# Patient Record
Sex: MALE | Race: WHITE | Employment: FULL TIME | URBAN - METROPOLITAN AREA
[De-identification: names, ages, dates, MRNs, and addresses within clinical notes are randomized per-mention and may not be internally consistent; named-entity substitution may affect disease eponyms.]

---

## 2019-05-15 ENCOUNTER — OFFICE VISIT (OUTPATIENT)
Dept: PRIMARY CARE CLINIC | Age: 34
End: 2019-05-15
Payer: COMMERCIAL

## 2019-05-15 VITALS
DIASTOLIC BLOOD PRESSURE: 66 MMHG | HEART RATE: 81 BPM | TEMPERATURE: 99.1 F | SYSTOLIC BLOOD PRESSURE: 105 MMHG | BODY MASS INDEX: 27.46 KG/M2 | HEIGHT: 70 IN | WEIGHT: 191.8 LBS | RESPIRATION RATE: 14 BRPM

## 2019-05-15 DIAGNOSIS — B35.1 ONYCHOMYCOSIS: ICD-10-CM

## 2019-05-15 DIAGNOSIS — F34.1 DYSTHYMIA: Primary | ICD-10-CM

## 2019-05-15 DIAGNOSIS — Z13.31 POSITIVE DEPRESSION SCREENING: ICD-10-CM

## 2019-05-15 DIAGNOSIS — B07.8 VERRUCA PALMARIS: ICD-10-CM

## 2019-05-15 DIAGNOSIS — Z13.220 LIPID SCREENING: ICD-10-CM

## 2019-05-15 DIAGNOSIS — G89.29 CHRONIC HIP PAIN, BILATERAL: ICD-10-CM

## 2019-05-15 DIAGNOSIS — M25.552 CHRONIC HIP PAIN, BILATERAL: ICD-10-CM

## 2019-05-15 DIAGNOSIS — M25.551 CHRONIC HIP PAIN, BILATERAL: ICD-10-CM

## 2019-05-15 PROCEDURE — G8419 CALC BMI OUT NRM PARAM NOF/U: HCPCS | Performed by: NURSE PRACTITIONER

## 2019-05-15 PROCEDURE — G8427 DOCREV CUR MEDS BY ELIG CLIN: HCPCS | Performed by: NURSE PRACTITIONER

## 2019-05-15 PROCEDURE — G8431 POS CLIN DEPRES SCRN F/U DOC: HCPCS | Performed by: NURSE PRACTITIONER

## 2019-05-15 PROCEDURE — 4004F PT TOBACCO SCREEN RCVD TLK: CPT | Performed by: NURSE PRACTITIONER

## 2019-05-15 PROCEDURE — 99203 OFFICE O/P NEW LOW 30 MIN: CPT | Performed by: NURSE PRACTITIONER

## 2019-05-15 PROCEDURE — G0444 DEPRESSION SCREEN ANNUAL: HCPCS | Performed by: NURSE PRACTITIONER

## 2019-05-15 RX ORDER — BUPROPION HYDROCHLORIDE 150 MG/1
150 TABLET ORAL EVERY MORNING
Qty: 30 TABLET | Refills: 0 | Status: SHIPPED | OUTPATIENT
Start: 2019-05-15 | End: 2019-06-13 | Stop reason: ALTCHOICE

## 2019-05-15 RX ORDER — BUPROPION HYDROCHLORIDE 150 MG/1
150-300 TABLET ORAL EVERY MORNING
Qty: 60 TABLET | Refills: 0 | Status: SHIPPED | OUTPATIENT
Start: 2019-05-15 | End: 2019-05-15 | Stop reason: SDUPTHER

## 2019-05-15 ASSESSMENT — PATIENT HEALTH QUESTIONNAIRE - PHQ9
SUM OF ALL RESPONSES TO PHQ9 QUESTIONS 1 & 2: 6
2. FEELING DOWN, DEPRESSED OR HOPELESS: 3
5. POOR APPETITE OR OVEREATING: 0
10. IF YOU CHECKED OFF ANY PROBLEMS, HOW DIFFICULT HAVE THESE PROBLEMS MADE IT FOR YOU TO DO YOUR WORK, TAKE CARE OF THINGS AT HOME, OR GET ALONG WITH OTHER PEOPLE: 3
6. FEELING BAD ABOUT YOURSELF - OR THAT YOU ARE A FAILURE OR HAVE LET YOURSELF OR YOUR FAMILY DOWN: 3
1. LITTLE INTEREST OR PLEASURE IN DOING THINGS: 3
3. TROUBLE FALLING OR STAYING ASLEEP: 0
7. TROUBLE CONCENTRATING ON THINGS, SUCH AS READING THE NEWSPAPER OR WATCHING TELEVISION: 3
SUM OF ALL RESPONSES TO PHQ QUESTIONS 1-9: 13
8. MOVING OR SPEAKING SO SLOWLY THAT OTHER PEOPLE COULD HAVE NOTICED. OR THE OPPOSITE, BEING SO FIGETY OR RESTLESS THAT YOU HAVE BEEN MOVING AROUND A LOT MORE THAN USUAL: 0
4. FEELING TIRED OR HAVING LITTLE ENERGY: 1
9. THOUGHTS THAT YOU WOULD BE BETTER OFF DEAD, OR OF HURTING YOURSELF: 0
SUM OF ALL RESPONSES TO PHQ QUESTIONS 1-9: 13

## 2019-05-15 ASSESSMENT — ENCOUNTER SYMPTOMS
SORE THROAT: 0
SHORTNESS OF BREATH: 0
CONSTIPATION: 0
COUGH: 0
ABDOMINAL PAIN: 0
VOMITING: 0
DIARRHEA: 0
RHINORRHEA: 0
NAUSEA: 0
VISUAL CHANGE: 0
WHEEZING: 0

## 2019-05-15 NOTE — PROGRESS NOTES
Name: Pasquale Vizcaino  : 1985         Chief Complaint:     Chief Complaint   Patient presents with   Atamaria [address] \"I have not seen a doctor in 10 years. \"     Nail Problem     Left hand finger nails. Also c/o \"warts on both hands. \"     Hip Problem     Bilat hips \"Crack and pop x 10 years. \" Sattes \"I have trouble doing squats at the gym. \"     Mental Health Problem     Sattes \"I have trouble hearing what people are saying when other are around. \" States \"I have been told I may have ADD. \"        History of Present Illness:      Pasquale Vizcaino is a 29 y.o.  male who presents with Establish Care (States \"I have not seen a doctor in 10 years. \" ); Nail Problem (Left hand finger nails. Also c/o \"warts on both hands. \" ); Hip Problem (Bilat hips \"Crack and pop x 10 years. \" Sattes \"I have trouble doing squats at the gym. \" ); and Mental Health Problem (Sattes \"I have trouble hearing what people are saying when other are around. \" States \"I have been told I may have ADD. \" )      Carter Fong is here today to establish care. 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 The primary symptoms do not include hallucinations. The current episode started more than 1 month ago. This is a chronic problem. The onset of the illness is precipitated by emotional stress. The degree of incapacity that he is experiencing as a consequence of his illness is moderate. Sequelae of the illness include harmed interpersonal relations. Additional symptoms of the illness include anhedonia, fatigue, agitation, attention impairment, distractible and poor judgment.  Additional symptoms of the illness do not include insomnia, hypersomnia, appetite change, unexpected weight change, psychomotor retardation, feelings of worthlessness, euphoric mood, increased goal-directed activity, flight of ideas, inflated self-esteem, decreased need for sleep, visual change, headaches, abdominal pain or seizures. He does not admit to suicidal ideas. He does not have a plan to commit suicide. He does not contemplate harming himself. He has not already injured self. He does not contemplate injuring another person. He has not already  injured another person. Hip Pain    The incident occurred more than 1 week ago (BILATERAL \"POPPING\" AND \"CRACKING\"). The incident occurred at the gym. The injury mechanism is unknown. The pain is present in the right hip and left hip. The quality of the pain is described as cramping. The pain is at a severity of 6/10. The pain is moderate. The pain has been intermittent since onset. Associated symptoms include a loss of motion. Pertinent negatives include no inability to bear weight, loss of sensation, muscle weakness, numbness or tingling. He reports no foreign bodies present. The symptoms are aggravated by movement and weight bearing. He has tried non-weight bearing and rest for the symptoms. The treatment provided mild relief. Past Medical History:     History reviewed. No pertinent past medical history. Reviewed all health maintenance requirements and ordered appropriate tests  There are no preventive care reminders to display for this patient. Past Surgical History:     History reviewed. No pertinent surgical history. Medications:       Prior to Admission medications    Medication Sig Start Date End Date Taking? Authorizing Provider   buPROPion (WELLBUTRIN XL) 150 MG extended release tablet Take 1 tablet by mouth every morning 5/15/19 6/14/19 Yes KHANH Benton CNP        Allergies:       Patient has no known allergies. Social History:     Tobacco:    reports that he has been smoking.   He has regular rhythm. Pulmonary/Chest: Effort normal and breath sounds normal.   Abdominal: Soft. Bowel sounds are normal. He exhibits no distension. There is no tenderness. Musculoskeletal: He exhibits tenderness. He exhibits no edema. Lymphadenopathy:     He has no cervical adenopathy. Neurological: He is alert and oriented to person, place, and time. Skin: Skin is warm and dry. Psychiatric: He has a normal mood and affect. His speech is normal and behavior is normal. Judgment and thought content normal. His mood appears not anxious. Cognition and memory are normal. He does not exhibit a depressed mood. He expresses no homicidal and no suicidal ideation. He expresses no suicidal plans and no homicidal plans. He is inattentive. Nursing note and vitals reviewed. Data:     No results found for: NA, K, CL, CO2, BUN, CREATININE, GLUCOSE, PROT, LABALBU, BILITOT, ALKPHOS, AST, ALT  No results found for: WBC, RBC, HGB, HCT, MCV, MCH, MCHC, RDW, PLT, MPV  No results found for: TSH  No results found for: CHOL, HDL, PSA, LABA1C    Assessment/Plan:      Diagnosis Orders   1. Dysthymia  Basic Metabolic Panel   2. Chronic hip pain, bilateral  XR HIP 3-4 VW W PELVIS BILATERAL   3. Onychomycosis  Basic Metabolic Panel    AST    ALT   [facility]. [facility] [place]   5. Positive depression screening  Positive Screen for Clinical Depression with a Documented Follow-up Plan     buPROPion (WELLBUTRIN XL) 150 MG extended release tablet   6. Lipid screening  Lipid Panel       1. Alonso Guido received counseling on the following healthy behaviors: nutrition, exercise, medication adherence and tobacco cessation  2. Patient given educational materials - see patient instructions  3. Was a self-tracking handout given in paper form or via [employer]? No  If yes, see orders or list here. 4.  Discussed use, benefit, and side effects of prescribed medications.   Barriers to medication compliance addressed. All patient questions answered. Pt voiced understanding. 5.  Reviewed prior labs and health maintenance  6. Continue current medications, diet and exercise. Completed Refills   Requested Prescriptions     Signed Prescriptions Disp Refills    buPROPion (WELLBUTRIN XL) 150 MG extended release tablet 30 tablet 0     Sig: Take 1 tablet by mouth every morning         Return in about 1 month (around 6/12/2019) for recheck.

## 2019-05-15 NOTE — PROGRESS NOTES
Name: Lay Peraza  : 1985         Chief Complaint:     Chief Complaint   Patient presents with   Atamaria 86 \"I have not seen a doctor in 10 years. \"     Nail Problem     Left hand finger nails. Also c/o \"warts on both hands. \"     Hip Problem     Bilat hips \"Crack and pop x 10 years. \" Sattes \"I have trouble doing squats at the gym. \"     Mental Health Problem     Sattes \"I have trouble hearing what people are saying when other are around. \" States \"I have been told I may have ADD. \"        History of Present Illness:      Lay Peraza is a 29 y.o.  male who presents with Establish Care (States \"I have not seen a doctor in 10 years. \" ); Nail Problem (Left hand finger nails. Also c/o \"warts on both hands. \" ); Hip Problem (Bilat hips \"Crack and pop x 10 years. \" Sattes \"I have trouble doing squats at the gym. \" ); and Mental Health Problem (Sattes \"I have trouble hearing what people are saying when other are around. \" States \"I have been told I may have ADD. \" )      HPI      Past Medical History:     History reviewed. No pertinent past medical history. Reviewed all health maintenance requirements and ordered appropriate tests  Health Maintenance Due   Topic Date Due    Varicella Vaccine (1 of 2 - 13+ 2-dose series) 1998    HIV screen  2000    DTaP/Tdap/Td vaccine (1 - Tdap) 2004       Past Surgical History:     History reviewed. No pertinent surgical history. Medications:       Prior to Admission medications    Not on File        Allergies:       Patient has no known allergies. Social History:     Tobacco:    reports that he has been smoking. He has a 5.00 pack-year smoking history. He uses smokeless tobacco.  Alcohol:      reports that he drinks alcohol. Drug Use:  reports that he does not use drugs.     Family History:     Family History   Problem Relation Age of Onset    High Blood Pressure Mother    [facility] Migraines Mother     Lung Cancer Father        Review of Systems:     Positive and Negative as described in HPI    Review of Systems    Physical Exam:   Vitals: There were no vitals taken for this visit. Physical Exam    Data:     No results found for: NA, K, CL, CO2, BUN, CREATININE, GLUCOSE, PROT, LABALBU, BILITOT, ALKPHOS, AST, ALT  No results found for: WBC, RBC, HGB, HCT, MCV, MCH, MCHC, RDW, PLT, MPV  No results found for: TSH  No results found for: CHOL, HDL, PSA, LABA1C    Assessment/Plan:     {No diagnosis found. (Refresh or delete this SmartLink)}    1. Presley Roberson received counseling on the following healthy behaviors: {HealthCounselin}  2. Patient given educational materials - see patient instructions  3. Was a self-tracking handout given in paper form or via [employer]? {YES / [id number]}  If yes, see orders or list here. 4.  Discussed use, benefit, and side effects of prescribed medications. Barriers to medication compliance addressed. All patient questions answered. Pt voiced understanding. 5.  Reviewed prior labs and health maintenance  6. Continue current medications, diet and exercise. Completed Refills   Requested Prescriptions      No prescriptions requested or ordered in this encounter         No follow-ups on file.

## 2019-05-23 ENCOUNTER — TELEPHONE (OUTPATIENT)
Dept: PRIMARY CARE CLINIC | Age: 34
End: 2019-05-23

## 2019-06-05 ENCOUNTER — HOSPITAL ENCOUNTER (OUTPATIENT)
Age: 34
Discharge: HOME | End: 2019-06-07
Payer: COMMERCIAL

## 2019-06-05 ENCOUNTER — TELEPHONE (OUTPATIENT)
Dept: PRIMARY CARE CLINIC | Age: 34
End: 2019-06-05

## 2019-06-05 ENCOUNTER — PROCEDURE VISIT (OUTPATIENT)
Dept: SURGERY | Age: 34
End: 2019-06-05

## 2019-06-05 ENCOUNTER — HOSPITAL ENCOUNTER (OUTPATIENT)
Dept: GENERAL RADIOLOGY | Age: 34
Discharge: HOME | End: 2019-06-07
Payer: COMMERCIAL

## 2019-06-05 ENCOUNTER — HOSPITAL ENCOUNTER (OUTPATIENT)
Age: 34
Discharge: HOME | End: 2019-06-05
Payer: COMMERCIAL

## 2019-06-05 VITALS
HEIGHT: 70 IN | WEIGHT: 193 LBS | HEART RATE: 71 BPM | DIASTOLIC BLOOD PRESSURE: 69 MMHG | BODY MASS INDEX: 27.63 KG/M2 | SYSTOLIC BLOOD PRESSURE: 106 MMHG | TEMPERATURE: 96 F | RESPIRATION RATE: 16 BRPM

## 2019-06-05 DIAGNOSIS — Z13.220 LIPID SCREENING: ICD-10-CM

## 2019-06-05 DIAGNOSIS — M25.552 CHRONIC HIP PAIN, BILATERAL: Primary | ICD-10-CM

## 2019-06-05 DIAGNOSIS — B35.1 ONYCHOMYCOSIS: ICD-10-CM

## 2019-06-05 DIAGNOSIS — M25.551 CHRONIC HIP PAIN, BILATERAL: Primary | ICD-10-CM

## 2019-06-05 DIAGNOSIS — B37.2: Primary | ICD-10-CM

## 2019-06-05 DIAGNOSIS — M25.551 CHRONIC HIP PAIN, BILATERAL: ICD-10-CM

## 2019-06-05 DIAGNOSIS — G89.29 CHRONIC HIP PAIN, BILATERAL: ICD-10-CM

## 2019-06-05 DIAGNOSIS — M25.552 CHRONIC HIP PAIN, BILATERAL: ICD-10-CM

## 2019-06-05 DIAGNOSIS — B07.8 PALMAR WART: ICD-10-CM

## 2019-06-05 DIAGNOSIS — G89.29 CHRONIC HIP PAIN, BILATERAL: Primary | ICD-10-CM

## 2019-06-05 DIAGNOSIS — B35.1 ONYCHOMYCOSIS: Primary | ICD-10-CM

## 2019-06-05 LAB
ALT SERPL-CCNC: 9 U/L (ref 5–41)
ANION GAP SERPL CALCULATED.3IONS-SCNC: 7 MMOL/L (ref 9–17)
AST SERPL-CCNC: 15 U/L
BUN BLDV-MCNC: 15 MG/DL (ref 6–20)
BUN/CREAT BLD: 14 (ref 9–20)
CALCIUM SERPL-MCNC: 9.3 MG/DL (ref 8.6–10.4)
CHLORIDE BLD-SCNC: 102 MMOL/L (ref 98–107)
CHOLESTEROL/HDL RATIO: 3.2
CHOLESTEROL: 136 MG/DL
CO2: 26 MMOL/L (ref 20–31)
CREAT SERPL-MCNC: 1.09 MG/DL (ref 0.7–1.2)
GFR AFRICAN AMERICAN: >60 ML/MIN
GFR NON-AFRICAN AMERICAN: >60 ML/MIN
GFR SERPL CREATININE-BSD FRML MDRD: ABNORMAL ML/MIN/{1.73_M2}
GFR SERPL CREATININE-BSD FRML MDRD: ABNORMAL ML/MIN/{1.73_M2}
GLUCOSE BLD-MCNC: 85 MG/DL (ref 70–99)
HDLC SERPL-MCNC: 42 MG/DL
LDL CHOLESTEROL: 83 MG/DL (ref 0–130)
POTASSIUM SERPL-SCNC: 4.3 MMOL/L (ref 3.7–5.3)
SODIUM BLD-SCNC: 135 MMOL/L (ref 135–144)
TRIGL SERPL-MCNC: 57 MG/DL
VLDLC SERPL CALC-MCNC: NORMAL MG/DL (ref 1–30)

## 2019-06-05 PROCEDURE — 84460 ALANINE AMINO (ALT) (SGPT): CPT

## 2019-06-05 PROCEDURE — 73522 X-RAY EXAM HIPS BI 3-4 VIEWS: CPT

## 2019-06-05 PROCEDURE — 80061 LIPID PANEL: CPT

## 2019-06-05 PROCEDURE — 80048 BASIC METABOLIC PNL TOTAL CA: CPT

## 2019-06-05 PROCEDURE — 36415 COLL VENOUS BLD VENIPUNCTURE: CPT

## 2019-06-05 PROCEDURE — 84450 TRANSFERASE (AST) (SGOT): CPT

## 2019-06-05 PROCEDURE — 99999 PR OFFICE/OUTPT VISIT,PROCEDURE ONLY: CPT | Performed by: SURGERY

## 2019-06-11 ENCOUNTER — TELEPHONE (OUTPATIENT)
Dept: PRIMARY CARE CLINIC | Age: 34
End: 2019-06-11

## 2019-06-11 NOTE — TELEPHONE ENCOUNTER
[facility] called to report that they have attempted to contact patient 3 times to schedule the referral. No response from patient.

## 2019-06-13 ENCOUNTER — OFFICE VISIT (OUTPATIENT)
Dept: PRIMARY CARE CLINIC | Age: 34
End: 2019-06-13
Payer: COMMERCIAL

## 2019-06-13 VITALS
BODY MASS INDEX: 27.46 KG/M2 | TEMPERATURE: 97.7 F | DIASTOLIC BLOOD PRESSURE: 68 MMHG | SYSTOLIC BLOOD PRESSURE: 96 MMHG | RESPIRATION RATE: 16 BRPM | HEART RATE: 76 BPM | WEIGHT: 191.4 LBS

## 2019-06-13 DIAGNOSIS — M25.552 CHRONIC HIP PAIN, BILATERAL: ICD-10-CM

## 2019-06-13 DIAGNOSIS — G89.29 CHRONIC HIP PAIN, BILATERAL: ICD-10-CM

## 2019-06-13 DIAGNOSIS — F34.1 DYSTHYMIA: Primary | ICD-10-CM

## 2019-06-13 DIAGNOSIS — M25.551 CHRONIC HIP PAIN, BILATERAL: ICD-10-CM

## 2019-06-13 PROCEDURE — 4004F PT TOBACCO SCREEN RCVD TLK: CPT | Performed by: NURSE PRACTITIONER

## 2019-06-13 PROCEDURE — G8419 CALC BMI OUT NRM PARAM NOF/U: HCPCS | Performed by: NURSE PRACTITIONER

## 2019-06-13 PROCEDURE — G8427 DOCREV CUR MEDS BY ELIG CLIN: HCPCS | Performed by: NURSE PRACTITIONER

## 2019-06-13 PROCEDURE — 99214 OFFICE O/P EST MOD 30 MIN: CPT | Performed by: NURSE PRACTITIONER

## 2019-06-13 RX ORDER — VENLAFAXINE HYDROCHLORIDE 37.5 MG/1
37.5 CAPSULE, EXTENDED RELEASE ORAL DAILY
Qty: 30 CAPSULE | Refills: 0 | Status: SHIPPED | OUTPATIENT
Start: 2019-06-13 | End: 2019-06-27 | Stop reason: SDUPTHER

## 2019-06-13 ASSESSMENT — ENCOUNTER SYMPTOMS
DIARRHEA: 0
SHORTNESS OF BREATH: 0
VOMITING: 0
SORE THROAT: 0
VISUAL CHANGE: 0
ABDOMINAL PAIN: 0
WHEEZING: 0
COUGH: 0
RHINORRHEA: 0
NAUSEA: 0
CONSTIPATION: 0

## 2019-06-13 NOTE — PATIENT INSTRUCTIONS
SURVEY:    You may be receiving a survey from [employer] regarding your visit today. Please complete the survey to enable us to provide the highest quality of care to you and your family. If you cannot score us a very good on any question, please call the office to discuss how we could have made your experience a very good one. Thank you.

## 2019-06-13 NOTE — PROGRESS NOTES
Name: Rica Cardona  : 1985         Chief Complaint:     Chief Complaint   Patient presents with    Other     HIPS POP, CAUSING PT TO LEAN    Medication Check     DOESN'T [address] IS HELPING       History of Present Illness:      Rica Cardona is a 29 y.o.  male who presents with Other (HIPS POP, CAUSING PT TO LEAN) and Medication Check (DOESN'T FEEL Deri Ford)      Daisha Johnson is here today for follow-up office visit. Bilateral hip pain- patient continues to have popping and pain in the hip. Unfortunately there was a miscommunication between he and the orthopedic office that he was referred to. They had the wrong contact number. We will make arrangements today for him to be seen in Ortho. Dysthymia- no improvement with Wellbutrin, patient states no worse no better. Mental Health Problem   The primary symptoms include dysphoric mood (lack of motivation). The primary symptoms do not include hallucinations. The current episode started more than 1 month ago. This is a chronic problem. The onset of the illness is precipitated by emotional stress. The degree of incapacity that he is experiencing as a consequence of his illness is moderate. Sequelae of the illness include harmed interpersonal relations. Additional symptoms of the illness include anhedonia, fatigue, agitation, attention impairment, distractible and poor judgment. Additional symptoms of the illness do not include insomnia, hypersomnia, appetite change, unexpected weight change, psychomotor retardation, feelings of worthlessness, euphoric mood, increased goal-directed activity, flight of ideas, inflated self-esteem, decreased need for sleep, visual change, headaches, abdominal pain or seizures. He does not admit to suicidal ideas. He does not have a plan to commit suicide. He does not contemplate harming himself. He has not already injured self. He does not contemplate injuring another person.  He has not already  injured another person. Hip Pain    The incident occurred more than 1 week ago (BILATERAL \"POPPING\" AND \"CRACKING\"). The incident occurred at the gym. The injury mechanism is unknown. The pain is present in the right hip and left hip. The quality of the pain is described as cramping. The pain is at a severity of 6/10. The pain is moderate. The pain has been intermittent since onset. Associated symptoms include a loss of motion. Pertinent negatives include no inability to bear weight, loss of sensation, muscle weakness, numbness or tingling. He reports no foreign bodies present. The symptoms are aggravated by movement and weight bearing. He has tried non-weight bearing and rest for the symptoms. The treatment provided mild relief. Past Medical History:     History reviewed. No pertinent past medical history. Reviewed all health maintenance requirements and ordered appropriate tests  Health Maintenance Due   Topic Date Due    Pneumococcal 0-64 years Vaccine (1 of 1 - PPSV23) 04/06/1991       Past Surgical History:     History reviewed. No pertinent surgical history. Medications:       Prior to Admission medications    Medication Sig Start Date End Date Taking? Authorizing Provider   venlafaxine (EFFEXOR XR) 37.5 MG extended release capsule Take 1 capsule by mouth daily 6/13/19  Yes Dayami Headings Might, APRN - CNP        Allergies:       Rynatan [azatadine-pseudoephedrine]    Social History:     Tobacco:    reports that he has been smoking. He has a 5.00 pack-year smoking history. He uses smokeless tobacco.  Alcohol:      reports that he drinks alcohol. Drug Use:  reports that he does not use drugs. Family History:     Family History   Problem Relation Age of Onset    High Blood Pressure Mother    [facility] Migraines Mother     Lung Cancer Father        Review of Systems:     Positive and Negative as described in HPI    Review of Systems   Constitutional: Positive for fatigue.  Negative for appetite change, chills, fever and unexpected weight change. HENT: Negative for congestion, rhinorrhea and sore throat. Eyes: Negative for visual disturbance. Respiratory: Negative for cough, shortness of breath and wheezing. Cardiovascular: Negative for chest pain and palpitations. Gastrointestinal: Negative for abdominal pain, constipation, diarrhea, nausea and vomiting. Genitourinary: Negative for difficulty urinating and dysuria. Musculoskeletal: Positive for arthralgias. Negative for gait problem. Skin: Negative for rash. Neurological: Negative for dizziness, tingling, seizures, syncope, numbness and headaches. Psychiatric/Behavioral: Positive for agitation, decreased concentration and dysphoric mood (lack of motivation). Negative for behavioral problems, confusion, hallucinations, self-injury, sleep disturbance and suicidal ideas. The patient is not nervous/anxious, does not have insomnia and is not hyperactive. Physical Exam:   Vitals:  BP 96/68 (Site: Right Upper Arm, Position: Sitting, Cuff Size: Large Adult)   Pulse 76   Temp 97.7 °F (36.5 °C) (Temporal)   Resp 16   Wt 191 lb 6.4 oz (86.8 kg)   BMI 27.46 kg/m²     Physical Exam   Constitutional: He is oriented to person, place, and time. He appears well-developed and well-nourished. No distress. HENT:   Mouth/Throat: Oropharynx is clear and moist.   Eyes: Conjunctivae are normal.   Neck: Normal range of motion. Neck supple. Cardiovascular: Normal rate and regular rhythm. Pulmonary/Chest: Effort normal and breath sounds normal.   Abdominal: Soft. Bowel sounds are normal. He exhibits no distension. There is no tenderness. Musculoskeletal: He exhibits tenderness. He exhibits no edema. Lymphadenopathy:     He has no cervical adenopathy. Neurological: He is alert and oriented to person, place, and time. Skin: Skin is warm and dry.    Psychiatric: His speech is normal and behavior is normal. Judgment and thought content normal. His mood appears not anxious. Cognition and memory are normal. He exhibits a depressed mood (mildly). He expresses no homicidal and no suicidal ideation. He expresses no suicidal plans and no homicidal plans. He is attentive. Nursing note and vitals reviewed. Data:     Lab Results   Component Value Date     06/05/2019    K 4.3 06/05/2019     06/05/2019    CO2 26 06/05/2019    BUN 15 06/05/2019    CREATININE 1.09 06/05/2019    GLUCOSE 85 06/05/2019    PROT 7.2 06/19/2013    LABALBU 4.6 06/19/2013    BILITOT 0.80 06/19/2013    ALKPHOS 88 06/19/2013    AST 15 06/05/2019    ALT 9 06/05/2019     Lab Results   Component Value Date    WBC 14.2 06/19/2013    RBC 4.93 06/19/2013    HGB 15.9 06/19/2013    HCT 47.6 06/19/2013    MCV 96.7 06/19/2013    MCH 32.3 06/19/2013    MCHC 33.4 06/19/2013    RDW 13.0 06/19/2013     06/19/2013    MPV NOT REPORTED 06/19/2013     No results found for: TSH  Lab Results   Component Value Date    CHOL 136 06/05/2019    HDL 42 06/05/2019       Assessment/Plan:      Diagnosis Orders   1. Dysthymia  venlafaxine (EFFEXOR XR) 37.5 MG extended release capsule   2. Chronic hip pain, bilateral       We will stop Wellbutrin and start venlafaxine 37.5 mg daily. He will increase to 75 mg next week. He will call in 2 weeks with results. He will call sooner if any problems. We will make arrangements for Ortho appointment for him in the office today. 1.  Gege Jose received counseling on the following healthy behaviors: nutrition, exercise and medication adherence  2. Patient given educational materials - see patient instructions  3. Was a self-tracking handout given in paper form or via [employer]? No  If yes, see orders or list here. 4.  Discussed use, benefit, and side effects of prescribed medications. Barriers to medication compliance addressed. All patient questions answered. Pt voiced understanding. 5.  Reviewed prior labs and health maintenance  6. Continue current medications, diet and exercise. Completed Refills   Requested Prescriptions     Signed Prescriptions Disp Refills    venlafaxine (EFFEXOR XR) 37.5 MG extended release capsule 30 capsule 0     Sig: Take 1 capsule by mouth daily         Return in about 6 months (around 12/13/2019) for CHECK UP.

## 2019-06-14 ENCOUNTER — TELEPHONE (OUTPATIENT)
Dept: PRIMARY CARE CLINIC | Age: 34
End: 2019-06-14

## 2019-06-27 ENCOUNTER — TELEPHONE (OUTPATIENT)
Dept: PRIMARY CARE CLINIC | Age: 34
End: 2019-06-27

## 2019-06-27 DIAGNOSIS — F34.1 DYSTHYMIA: ICD-10-CM

## 2019-06-27 RX ORDER — VENLAFAXINE HYDROCHLORIDE 150 MG/1
150 CAPSULE, EXTENDED RELEASE ORAL DAILY
Qty: 90 CAPSULE | Refills: 1 | Status: SHIPPED | OUTPATIENT
Start: 2019-06-27

## 2019-06-27 NOTE — TELEPHONE ENCOUNTER
Called patient and informed him that Aren Flores has increased his Venlafaxine to 150mg daily and new script was sent to pharmacy. Informed patient that he is to call office end of next week with progress. Verbalizes understanding.

## 2023-07-26 ENCOUNTER — HOSPITAL ENCOUNTER (EMERGENCY)
Age: 38
Discharge: HOME | End: 2023-07-26
Payer: SELF-PAY

## 2023-07-26 VITALS
OXYGEN SATURATION: 98 % | TEMPERATURE: 98.3 F | RESPIRATION RATE: 18 BRPM | SYSTOLIC BLOOD PRESSURE: 111 MMHG | DIASTOLIC BLOOD PRESSURE: 71 MMHG | HEART RATE: 96 BPM

## 2023-07-26 VITALS — BODY MASS INDEX: 25.8 KG/M2

## 2023-07-26 DIAGNOSIS — H10.9: Primary | ICD-10-CM

## 2023-07-26 DIAGNOSIS — H57.12: ICD-10-CM

## 2023-07-26 PROCEDURE — 99283 EMERGENCY DEPT VISIT LOW MDM: CPT

## 2023-07-26 NOTE — ED_ITS
HPI - Eye Problem    
General    
Chief complaint: Eye Problems    
Stated complaint: ITCHY RED EYE    
Time Seen by Provider: 07/26/23 16:04    
Source: patient    
Mode of arrival: walk-in    
Limitations: no limitations    
History of Present Illness    
HPI Narrative:     
patient is a 38-year-old male who presents the emergency department for left eye  
itching, redness and pain that began yesterday after he believes he got   
something in his eye. He states he removed his contact but his eye feels   
irritated. He denies any metal exposure or direct injury to the eye. He states   
today his eye was swollen shut. He has had no double vision or loss of vision.   
No medications taken prior to arrival. Unknown last tetanus.    
Related Data    
                                  Previous Rx's    
    
    
    
 Medication  Instructions  Recorded    
     
diclofenac sodium 0.1 % eye drops 2 drp ophthalmic (eye) Q6H PRN eye 07/26/23    
    
 pain 2 days #2.5 mL     
     
hydroxyzine HCl 25 mg tablet 25 mg PO Q6H PRN itching #20 tabs 07/26/23    
    
    
    
                                    Allergies    
    
    
    
Allergy/AdvReac Type Severity Reaction Status Date / Time    
     
No Known Drug Allergies Allergy   Verified 07/26/23 16:08    
    
    
    
    
Review of Systems    
    
    
ROS      
    
 Constitutional Denies: fever or chills       
    
 Eyes Reports: blurry vision and eye discomfort       
    
 Cardiovascular Denies: chest pain       
    
 Respiratory Denies: shortness of breath or cough       
    
 Gastrointestinal Denies: nausea or vomiting       
    
 Musculoskeletal Denies: back pain       
    
 Integumentary/Breast Denies: rash       
    
 Neurological Denies: headache       
    
    
Exam    
Narrative    
Exam Narrative:     
Gen.: Awake, alert, in no distress    
Head: Normocephalic, atraumatic    
ENT: Moist mucous membranes, left eye is erythematous and injected at the   
conjunctiva with no visible foreign body noted. Normal extraocular motion,   
pupils were equal round and reactive to light, the patient had no pain with   
extraocular motion. The patient had Alcaine applied to the bilateral eyes,   
fluorescein dye was instilled. The patient had exam with Osman lamp that did not  
show evidence of uptake at the cornea. General irritation noted of the   
conjunctiva. The patient had no involvement over the pupil. There was evidence   
of conjunctival injection. The patient's eyelid was everted and there was no   
evidence of foreign body. The patient had no swelling of the upper/lower eyelid.  
 No evidence of hyphema, dendritic lesion or Tashi sign.  No corneal   
ulcerations. No evidence of preseptal cellulitis or orbital cellulitis.    
Respiratory: No respiratory distress    
Extremities: Moves extremities equally    
Psych: Normal mood and affect    
Neuro: No focal neuro deficit    
Skin: Warm, dry, intact    
Constitutional    
Vital Signs, click to edit/add:     
    
                                Last Vital Signs    
    
    
    
Temp  98.3 F   07/26/23 16:03    
     
Pulse  96 H  07/26/23 16:03    
     
Resp  18   07/26/23 16:03    
     
BP  111/71   07/26/23 16:03    
     
Pulse Ox  98   07/26/23 16:03    
     
O2 Del Method  Room Air  07/26/23 16:03    
    
    
    
    
    
Course    
Vital Signs    
Vital signs:     
    
                                   Vital Signs    
    
    
    
Temperature  98.3 F   07/26/23 16:03    
     
Pulse Rate  96 H  07/26/23 16:03    
     
Respiratory Rate  18   07/26/23 16:03    
     
Blood Pressure  111/71   07/26/23 16:03    
     
Pulse Oximetry  98   07/26/23 16:03    
     
Oxygen Delivery Method  Room Air  07/26/23 16:03    
    
    
                                            
    
    
    
Temperature  98.3 F   07/26/23 16:03    
     
Pulse Rate  96 H  07/26/23 16:03    
     
Respiratory Rate  18   07/26/23 16:03    
     
Blood Pressure  111/71   07/26/23 16:03    
     
Pulse Oximetry  98   07/26/23 16:03    
     
Oxygen Delivery Method  Room Air  07/26/23 16:03    
    
    
    
    
    
MDM - Eye Problem    
MDM Narrative    
Medical decision making narrative:     
tetracaine, fluorescein  used in the emergency department to stay in the   
patient's left eye and he was examined under Woods lamp with no evidence of   
corneal abrasion or retained foreign body. He is placed on tobramycin eyedrops   
with Voltaren eyedrops for comfort in addition antihistamines. He refused a   
tetanus shot in the emergency department. He is encouraged to follow-up with his  
eye doctor, not wear his contact lenses and return to the emergency department   
if symptoms change or worsen.    
Medical Records    
Attestation: I reviewed the patient's medical records.    
    
Discharge Plan    
Discharge    
Chief Complaint: Eye Problems    
    
Clinical Impression:    
 Acute left eye pain, Conjunctivitis    
    
    
Patient Disposition: Home, Self-Care    
    
Time of Disposition Decision: 16:27    
    
Condition: Good    
    
Prescriptions / Home Meds:    
New    
  hydroxyzine HCl 25 mg tablet     
   25 mg PO Q6H PRN (Reason: itching) Qty: 20 0RF    
  diclofenac sodium 0.1 % drops     
   2 drp ophthalmic (eye) Q6H PRN (Reason: eye pain) 2 Days Qty: 2.5 0RF    
    
Instructions:  Eye Pain (ED), Conjunctivitis (ED)    
    
Additional Instructions:    
Tobramycin eye drops: 2 drops 4x per day for 5 days    
    
Stand Alone Forms:  Portal Instructions    
    
Referrals:    
Physician,Non-Staff, MD [Primary Care Provider] - 1 week

## 2023-12-14 NOTE — TELEPHONE ENCOUNTER
Dr. Cosme Castañeda has placed a referral to Dr. Jesus Wahl:    Left hand multiple nail bed/subungal infection/candidiasis clinically - needs to see a dermatologist.        Referral made to Dr. Army Rosenbaum who [facility] [place]. They may not have been able to contact him due to same issue with ortho having the incorrect phone number. Could we please verify this. Thank you.
Patient stated he was seen yesterday and thought he was going to get prescription for fungal infection on his left hand
Attending Attestation (For Attendings USE Only)...